# Patient Record
(demographics unavailable — no encounter records)

---

## 2024-11-08 NOTE — DISCUSSION/SUMMARY
[FreeTextEntry1] : 10 year old w/ asthma exacerbation in setting of likely pneumonia. well appearing w/ normal o2. albuterol given w/ slight improvement but still noted to have persistent wheezing on right side concerning for pneumonia (also has history of multifocal pneumonia a year ago) -send for cxr and RVP (to r/o mycoplasma) Complete antibiotic course. Potential side effect of antibiotics includes but not limited to diarrhea. Provide ibuprofen as needed for pain or fever. If no improvement within 48 hours return for re-evaluation. -start prednisone daily x 3 days -continue albuterol q4h -recheck in 2-3 days

## 2024-11-09 NOTE — DISCUSSION/SUMMARY
[FreeTextEntry1] : 10 year old w/ improving pneumonia. well appaering, no wheezing appreciated -complete augmentin -discontinue prednisone -continue albuterol every 4 hours for next 24-48 hrs then wean as tolerated

## 2024-11-09 NOTE — HISTORY OF PRESENT ILLNESS
[de-identified] : Recheck pneumonia [FreeTextEntry6] : symptoms improving has received 2 doses of prednisone. albuterol every 4 hours. taking augmentin w/o issue. cxr was clear but advised to continue due to history of multifocal pneumonia

## 2024-11-11 NOTE — DISCUSSION/SUMMARY
[Normal Growth] : growth [Normal Development] : development  [No Elimination Concerns] : elimination [Continue Regimen] : feeding [No Skin Concerns] : skin [Normal Sleep Pattern] : sleep [None] : no medical problems [Anticipatory Guidance Given] : Anticipatory guidance addressed as per the history of present illness section [School] : school [Development and Mental Health] : development and mental health [Nutrition and Physical Activity] : nutrition and physical activity [Oral Health] : oral health [Safety] : safety [No Vaccines] : no vaccines needed [No Medications] : ~He/She~ is not on any medications [Patient] : patient [Parent/Guardian] : Parent/Guardian [Full Activity without restrictions including Physical Education & Athletics] : Full Activity without restrictions including Physical Education & Athletics [I have examined the above-named student and completed the preparticipation physical evaluation. The athlete does not present apparent clinical contraindications to practice and participate in sport(s) as outlined above. A copy of the physical exam is on r] : I have examined the above-named student and completed the preparticipation physical evaluation. The athlete does not present apparent clinical contraindications to practice and participate in sport(s) as outlined above. A copy of the physical exam is on record in my office and can be made available to the school at the request of the parents. If conditions arise after the athlete has been cleared for participation, the physician may rescind the clearance until the problem is resolved and the potential consequences are completely explained to the athlete (and parents/guardians). [FreeTextEntry1] : Impression: No growth, development, elimination, feeding, skin and sleep concerns. No known medical problems. No medications. Information discussed with parent/guardian.   The patient should participate in 60 minutes or more of physical activity a day. Encourage structured physical activity when possible (ie, participation in team or individual sports, or supervised exercise sessions). The patient would be more likely to participate consistently in these activities because they would be accountable to a  or leader. The patient may engage in a gym or fitness center if possible. Educational material relating to physical activity was provided to the patient. Continue balanced diet with all food groups. Brush teeth twice a day with toothbrush. Recommend visit to dentist. Help child to maintain consistent daily routines and sleep schedule. School discussed. Ensure home is safe. Taught child about personal safety.  Respiratory - Complete oral antibiotics. Return for Tdap vaccine.  Labs  Follow up in 1 year for well visit

## 2024-11-11 NOTE — HISTORY OF PRESENT ILLNESS
[Mother] : mother [Fruit] : fruit [Vegetables] : vegetables [Meat] : meat [Grains] : grains [Eats healthy meals and snacks] : eats healthy meals and snacks [Eats meals with family] : eats meals with family [Normal] : Normal [Brushing teeth twice/d] : brushing teeth twice per day [Yes] : Patient goes to dentist yearly [Toothpaste] : Primary Fluoride Source: Toothpaste [Playtime (60 min/d)] : playtime 60 min a day [Participates in after-school activities] : participates in after-school activities [< 2 hrs of screen time per day] : less than 2 hrs of screen time per day [Appropiate parent-child-sibling interaction] : appropriate parent-child-sibling interaction [Has Friends] : has friends [Has chance to make own decisions] : has chance to make own decisions [Grade ___] : Grade [unfilled] [Adequate social interactions] : adequate social interactions [Adequate behavior] : adequate behavior [Adequate performance] : adequate performance [Adequate attention] : adequate attention [No difficulties with Homework] : no difficulties with homework [FreeTextEntry7] : Mother requesting new allergy testing.